# Patient Record
Sex: FEMALE | Race: WHITE | NOT HISPANIC OR LATINO | URBAN - METROPOLITAN AREA
[De-identification: names, ages, dates, MRNs, and addresses within clinical notes are randomized per-mention and may not be internally consistent; named-entity substitution may affect disease eponyms.]

---

## 2017-09-01 ENCOUNTER — OUTPATIENT (OUTPATIENT)
Dept: OUTPATIENT SERVICES | Facility: HOSPITAL | Age: 82
LOS: 1 days | End: 2017-09-01
Payer: MEDICAID

## 2017-09-01 DIAGNOSIS — Z96.60 PRESENCE OF UNSPECIFIED ORTHOPEDIC JOINT IMPLANT: Chronic | ICD-10-CM

## 2017-09-01 PROCEDURE — G9001: CPT

## 2017-09-19 ENCOUNTER — INPATIENT (INPATIENT)
Facility: HOSPITAL | Age: 82
LOS: 2 days | Discharge: HOME HEALTH | End: 2017-09-22
Attending: HOSPITALIST | Admitting: HOSPITALIST
Payer: MEDICARE

## 2017-09-19 VITALS
SYSTOLIC BLOOD PRESSURE: 108 MMHG | WEIGHT: 160.06 LBS | TEMPERATURE: 100 F | DIASTOLIC BLOOD PRESSURE: 55 MMHG | HEART RATE: 99 BPM | RESPIRATION RATE: 16 BRPM | OXYGEN SATURATION: 94 %

## 2017-09-19 DIAGNOSIS — Z96.60 PRESENCE OF UNSPECIFIED ORTHOPEDIC JOINT IMPLANT: Chronic | ICD-10-CM

## 2017-09-19 NOTE — ED ADULT TRIAGE NOTE - CHIEF COMPLAINT QUOTE
Pt arrives from home via EMS.  Pt noticed a strong smell to her urine on Saturday.  Pt daughter called PMD and started an antibiotic.  Pt has been having fevers "on and off."  Pt took Tylenol 500mg at 19:30PM.  Pt daughter reports pt is A&Ox3 but is more lethargic than usual.

## 2017-09-20 ENCOUNTER — TRANSCRIPTION ENCOUNTER (OUTPATIENT)
Age: 82
End: 2017-09-20

## 2017-09-20 DIAGNOSIS — N30.01 ACUTE CYSTITIS WITH HEMATURIA: ICD-10-CM

## 2017-09-20 DIAGNOSIS — M81.0 AGE-RELATED OSTEOPOROSIS WITHOUT CURRENT PATHOLOGICAL FRACTURE: ICD-10-CM

## 2017-09-20 DIAGNOSIS — N18.4 CHRONIC KIDNEY DISEASE, STAGE 4 (SEVERE): ICD-10-CM

## 2017-09-20 DIAGNOSIS — I10 ESSENTIAL (PRIMARY) HYPERTENSION: ICD-10-CM

## 2017-09-20 DIAGNOSIS — N39.0 URINARY TRACT INFECTION, SITE NOT SPECIFIED: ICD-10-CM

## 2017-09-20 DIAGNOSIS — E03.9 HYPOTHYROIDISM, UNSPECIFIED: ICD-10-CM

## 2017-09-20 DIAGNOSIS — I82.90 ACUTE EMBOLISM AND THROMBOSIS OF UNSPECIFIED VEIN: ICD-10-CM

## 2017-09-20 DIAGNOSIS — M19.90 UNSPECIFIED OSTEOARTHRITIS, UNSPECIFIED SITE: ICD-10-CM

## 2017-09-20 LAB
ALBUMIN SERPL ELPH-MCNC: 3.5 G/DL — SIGNIFICANT CHANGE UP (ref 3.3–5)
ALP SERPL-CCNC: 46 U/L — SIGNIFICANT CHANGE UP (ref 40–120)
ALT FLD-CCNC: 13 U/L — SIGNIFICANT CHANGE UP (ref 4–33)
APPEARANCE UR: SIGNIFICANT CHANGE UP
AST SERPL-CCNC: 11 U/L — SIGNIFICANT CHANGE UP (ref 4–32)
BACTERIA # UR AUTO: HIGH
BASE EXCESS BLDV CALC-SCNC: -2.2 MMOL/L — SIGNIFICANT CHANGE UP
BASOPHILS # BLD AUTO: 0.02 K/UL — SIGNIFICANT CHANGE UP (ref 0–0.2)
BASOPHILS NFR BLD AUTO: 0.2 % — SIGNIFICANT CHANGE UP (ref 0–2)
BILIRUB SERPL-MCNC: 1 MG/DL — SIGNIFICANT CHANGE UP (ref 0.2–1.2)
BILIRUB UR-MCNC: NEGATIVE — SIGNIFICANT CHANGE UP
BLOOD GAS VENOUS - CREATININE: 2.09 MG/DL — HIGH (ref 0.5–1.3)
BLOOD UR QL VISUAL: HIGH
BUN SERPL-MCNC: 29 MG/DL — HIGH (ref 7–23)
CALCIUM SERPL-MCNC: 9.3 MG/DL — SIGNIFICANT CHANGE UP (ref 8.4–10.5)
CHLORIDE BLDV-SCNC: 108 MMOL/L — SIGNIFICANT CHANGE UP (ref 96–108)
CHLORIDE SERPL-SCNC: 104 MMOL/L — SIGNIFICANT CHANGE UP (ref 98–107)
CO2 SERPL-SCNC: 19 MMOL/L — LOW (ref 22–31)
COLOR SPEC: YELLOW — SIGNIFICANT CHANGE UP
CREAT SERPL-MCNC: 2.18 MG/DL — HIGH (ref 0.5–1.3)
EOSINOPHIL # BLD AUTO: 0.56 K/UL — HIGH (ref 0–0.5)
EOSINOPHIL NFR BLD AUTO: 5 % — SIGNIFICANT CHANGE UP (ref 0–6)
GAS PNL BLDV: 133 MMOL/L — LOW (ref 136–146)
GLUCOSE BLDV-MCNC: 157 — HIGH (ref 70–99)
GLUCOSE SERPL-MCNC: 162 MG/DL — HIGH (ref 70–99)
GLUCOSE UR-MCNC: NEGATIVE — SIGNIFICANT CHANGE UP
HCO3 BLDV-SCNC: 23 MMOL/L — SIGNIFICANT CHANGE UP (ref 20–27)
HCT VFR BLD CALC: 35.7 % — SIGNIFICANT CHANGE UP (ref 34.5–45)
HCT VFR BLDV CALC: 38 % — SIGNIFICANT CHANGE UP (ref 34.5–45)
HGB BLD-MCNC: 12.3 G/DL — SIGNIFICANT CHANGE UP (ref 11.5–15.5)
HGB BLDV-MCNC: 12.3 G/DL — SIGNIFICANT CHANGE UP (ref 11.5–15.5)
IMM GRANULOCYTES # BLD AUTO: 0.07 # — SIGNIFICANT CHANGE UP
IMM GRANULOCYTES NFR BLD AUTO: 0.6 % — SIGNIFICANT CHANGE UP (ref 0–1.5)
KETONES UR-MCNC: SIGNIFICANT CHANGE UP
LACTATE BLDV-MCNC: 1.3 MMOL/L — SIGNIFICANT CHANGE UP (ref 0.5–2)
LEUKOCYTE ESTERASE UR-ACNC: HIGH
LYMPHOCYTES # BLD AUTO: 0.98 K/UL — LOW (ref 1–3.3)
LYMPHOCYTES # BLD AUTO: 8.7 % — LOW (ref 13–44)
MCHC RBC-ENTMCNC: 33.2 PG — SIGNIFICANT CHANGE UP (ref 27–34)
MCHC RBC-ENTMCNC: 34.5 % — SIGNIFICANT CHANGE UP (ref 32–36)
MCV RBC AUTO: 96.5 FL — SIGNIFICANT CHANGE UP (ref 80–100)
MONOCYTES # BLD AUTO: 0.61 K/UL — SIGNIFICANT CHANGE UP (ref 0–0.9)
MONOCYTES NFR BLD AUTO: 5.4 % — SIGNIFICANT CHANGE UP (ref 2–14)
MUCOUS THREADS # UR AUTO: SIGNIFICANT CHANGE UP
NEUTROPHILS # BLD AUTO: 8.98 K/UL — HIGH (ref 1.8–7.4)
NEUTROPHILS NFR BLD AUTO: 80.1 % — HIGH (ref 43–77)
NITRITE UR-MCNC: NEGATIVE — SIGNIFICANT CHANGE UP
NRBC # FLD: 0 — SIGNIFICANT CHANGE UP
PCO2 BLDV: 29 MMHG — LOW (ref 41–51)
PH BLDV: 7.47 PH — HIGH (ref 7.32–7.43)
PH UR: 7.5 — SIGNIFICANT CHANGE UP (ref 4.6–8)
PLATELET # BLD AUTO: 221 K/UL — SIGNIFICANT CHANGE UP (ref 150–400)
PMV BLD: 9.3 FL — SIGNIFICANT CHANGE UP (ref 7–13)
PO2 BLDV: 63 MMHG — HIGH (ref 35–40)
POTASSIUM BLDV-SCNC: 3.6 MMOL/L — SIGNIFICANT CHANGE UP (ref 3.4–4.5)
POTASSIUM SERPL-MCNC: 3.9 MMOL/L — SIGNIFICANT CHANGE UP (ref 3.5–5.3)
POTASSIUM SERPL-SCNC: 3.9 MMOL/L — SIGNIFICANT CHANGE UP (ref 3.5–5.3)
PROT SERPL-MCNC: 6.4 G/DL — SIGNIFICANT CHANGE UP (ref 6–8.3)
PROT UR-MCNC: 500 — HIGH
RBC # BLD: 3.7 M/UL — LOW (ref 3.8–5.2)
RBC # FLD: 12.1 % — SIGNIFICANT CHANGE UP (ref 10.3–14.5)
RBC CASTS # UR COMP ASSIST: HIGH (ref 0–?)
SAO2 % BLDV: 93.4 % — HIGH (ref 60–85)
SODIUM SERPL-SCNC: 138 MMOL/L — SIGNIFICANT CHANGE UP (ref 135–145)
SP GR SPEC: 1.02 — SIGNIFICANT CHANGE UP (ref 1–1.03)
SQUAMOUS # UR AUTO: SIGNIFICANT CHANGE UP
UROBILINOGEN FLD QL: NORMAL E.U. — SIGNIFICANT CHANGE UP (ref 0.1–0.2)
WBC # BLD: 11.22 K/UL — HIGH (ref 3.8–10.5)
WBC # FLD AUTO: 11.22 K/UL — HIGH (ref 3.8–10.5)
WBC CLUMPS #/AREA URNS HPF: PRESENT — HIGH (ref 0–?)
WBC UR QL: >50 — HIGH (ref 0–?)

## 2017-09-20 PROCEDURE — 71010: CPT | Mod: 26

## 2017-09-20 PROCEDURE — 99223 1ST HOSP IP/OBS HIGH 75: CPT | Mod: GC

## 2017-09-20 PROCEDURE — 12345: CPT | Mod: GC,NC

## 2017-09-20 RX ORDER — RALOXIFENE HYDROCHLORIDE 60 MG/1
1 TABLET, COATED ORAL
Qty: 0 | Refills: 0 | COMMUNITY

## 2017-09-20 RX ORDER — SODIUM CHLORIDE 0.65 %
2 AEROSOL, SPRAY (ML) NASAL
Qty: 0 | Refills: 0 | COMMUNITY

## 2017-09-20 RX ORDER — SODIUM CHLORIDE 9 MG/ML
1000 INJECTION INTRAMUSCULAR; INTRAVENOUS; SUBCUTANEOUS
Qty: 0 | Refills: 0 | Status: DISCONTINUED | OUTPATIENT
Start: 2017-09-20 | End: 2017-09-20

## 2017-09-20 RX ORDER — FOLIC ACID 0.8 MG
0.8 TABLET ORAL DAILY
Qty: 0 | Refills: 0 | Status: DISCONTINUED | OUTPATIENT
Start: 2017-09-20 | End: 2017-09-22

## 2017-09-20 RX ORDER — RISEDRONATE SODIUM 25.8; 4.2 MG/1; MG/1
1 TABLET, FILM COATED ORAL
Qty: 0 | Refills: 0 | COMMUNITY

## 2017-09-20 RX ORDER — ACETAMINOPHEN 500 MG
650 TABLET ORAL EVERY 6 HOURS
Qty: 0 | Refills: 0 | Status: DISCONTINUED | OUTPATIENT
Start: 2017-09-20 | End: 2017-09-22

## 2017-09-20 RX ORDER — CHOLECALCIFEROL (VITAMIN D3) 125 MCG
2000 CAPSULE ORAL DAILY
Qty: 0 | Refills: 0 | Status: DISCONTINUED | OUTPATIENT
Start: 2017-09-20 | End: 2017-09-22

## 2017-09-20 RX ORDER — HEPARIN SODIUM 5000 [USP'U]/ML
5000 INJECTION INTRAVENOUS; SUBCUTANEOUS EVERY 8 HOURS
Qty: 0 | Refills: 0 | Status: DISCONTINUED | OUTPATIENT
Start: 2017-09-20 | End: 2017-09-22

## 2017-09-20 RX ORDER — LEVOTHYROXINE SODIUM 125 MCG
50 TABLET ORAL
Qty: 0 | Refills: 0 | Status: DISCONTINUED | OUTPATIENT
Start: 2017-09-20 | End: 2017-09-22

## 2017-09-20 RX ORDER — SODIUM CHLORIDE 0.65 %
1 AEROSOL, SPRAY (ML) NASAL
Qty: 0 | Refills: 0 | Status: DISCONTINUED | OUTPATIENT
Start: 2017-09-20 | End: 2017-09-22

## 2017-09-20 RX ORDER — MAGNESIUM OXIDE 400 MG ORAL TABLET 241.3 MG
1 TABLET ORAL
Qty: 0 | Refills: 0 | COMMUNITY

## 2017-09-20 RX ORDER — LANOLIN ALCOHOL/MO/W.PET/CERES
1 CREAM (GRAM) TOPICAL
Qty: 0 | Refills: 0 | COMMUNITY

## 2017-09-20 RX ORDER — CEFTRIAXONE 500 MG/1
1 INJECTION, POWDER, FOR SOLUTION INTRAMUSCULAR; INTRAVENOUS EVERY 24 HOURS
Qty: 0 | Refills: 0 | Status: DISCONTINUED | OUTPATIENT
Start: 2017-09-21 | End: 2017-09-22

## 2017-09-20 RX ORDER — SODIUM CHLORIDE 9 MG/ML
500 INJECTION INTRAMUSCULAR; INTRAVENOUS; SUBCUTANEOUS ONCE
Qty: 0 | Refills: 0 | Status: DISCONTINUED | OUTPATIENT
Start: 2017-09-20 | End: 2017-09-20

## 2017-09-20 RX ORDER — PREGABALIN 225 MG/1
1000 CAPSULE ORAL DAILY
Qty: 0 | Refills: 0 | Status: DISCONTINUED | OUTPATIENT
Start: 2017-09-20 | End: 2017-09-22

## 2017-09-20 RX ORDER — SODIUM CHLORIDE 9 MG/ML
1000 INJECTION INTRAMUSCULAR; INTRAVENOUS; SUBCUTANEOUS ONCE
Qty: 0 | Refills: 0 | Status: COMPLETED | OUTPATIENT
Start: 2017-09-20 | End: 2017-09-20

## 2017-09-20 RX ORDER — SENNA PLUS 8.6 MG/1
2 TABLET ORAL AT BEDTIME
Qty: 0 | Refills: 0 | Status: DISCONTINUED | OUTPATIENT
Start: 2017-09-20 | End: 2017-09-22

## 2017-09-20 RX ORDER — CEFTRIAXONE 500 MG/1
1 INJECTION, POWDER, FOR SOLUTION INTRAMUSCULAR; INTRAVENOUS ONCE
Qty: 0 | Refills: 0 | Status: COMPLETED | OUTPATIENT
Start: 2017-09-20 | End: 2017-09-20

## 2017-09-20 RX ORDER — MAGNESIUM OXIDE 400 MG ORAL TABLET 241.3 MG
400 TABLET ORAL EVERY 12 HOURS
Qty: 0 | Refills: 0 | Status: DISCONTINUED | OUTPATIENT
Start: 2017-09-20 | End: 2017-09-22

## 2017-09-20 RX ORDER — INFLUENZA VIRUS VACCINE 15; 15; 15; 15 UG/.5ML; UG/.5ML; UG/.5ML; UG/.5ML
0.5 SUSPENSION INTRAMUSCULAR ONCE
Qty: 0 | Refills: 0 | Status: DISCONTINUED | OUTPATIENT
Start: 2017-09-20 | End: 2017-09-22

## 2017-09-20 RX ORDER — ASPIRIN/CALCIUM CARB/MAGNESIUM 324 MG
81 TABLET ORAL DAILY
Qty: 0 | Refills: 0 | Status: DISCONTINUED | OUTPATIENT
Start: 2017-09-20 | End: 2017-09-22

## 2017-09-20 RX ORDER — LEVOTHYROXINE SODIUM 125 MCG
75 TABLET ORAL
Qty: 0 | Refills: 0 | Status: DISCONTINUED | OUTPATIENT
Start: 2017-09-20 | End: 2017-09-22

## 2017-09-20 RX ORDER — RALOXIFENE HYDROCHLORIDE 60 MG/1
60 TABLET, COATED ORAL DAILY
Qty: 0 | Refills: 0 | Status: DISCONTINUED | OUTPATIENT
Start: 2017-09-20 | End: 2017-09-22

## 2017-09-20 RX ADMIN — Medication 0.8 MILLIGRAM(S): at 14:21

## 2017-09-20 RX ADMIN — RALOXIFENE HYDROCHLORIDE 60 MILLIGRAM(S): 60 TABLET, COATED ORAL at 14:21

## 2017-09-20 RX ADMIN — Medication 81 MILLIGRAM(S): at 13:06

## 2017-09-20 RX ADMIN — Medication 2000 UNIT(S): at 13:06

## 2017-09-20 RX ADMIN — PREGABALIN 1000 MICROGRAM(S): 225 CAPSULE ORAL at 13:06

## 2017-09-20 RX ADMIN — HEPARIN SODIUM 5000 UNIT(S): 5000 INJECTION INTRAVENOUS; SUBCUTANEOUS at 13:06

## 2017-09-20 RX ADMIN — SODIUM CHLORIDE 75 MILLILITER(S): 9 INJECTION INTRAMUSCULAR; INTRAVENOUS; SUBCUTANEOUS at 04:01

## 2017-09-20 RX ADMIN — MAGNESIUM OXIDE 400 MG ORAL TABLET 400 MILLIGRAM(S): 241.3 TABLET ORAL at 06:55

## 2017-09-20 RX ADMIN — SODIUM CHLORIDE 1000 MILLILITER(S): 9 INJECTION INTRAMUSCULAR; INTRAVENOUS; SUBCUTANEOUS at 01:56

## 2017-09-20 RX ADMIN — Medication 50 MICROGRAM(S): at 06:55

## 2017-09-20 RX ADMIN — SODIUM CHLORIDE 75 MILLILITER(S): 9 INJECTION INTRAMUSCULAR; INTRAVENOUS; SUBCUTANEOUS at 06:55

## 2017-09-20 RX ADMIN — HEPARIN SODIUM 5000 UNIT(S): 5000 INJECTION INTRAVENOUS; SUBCUTANEOUS at 22:31

## 2017-09-20 RX ADMIN — MAGNESIUM OXIDE 400 MG ORAL TABLET 400 MILLIGRAM(S): 241.3 TABLET ORAL at 17:11

## 2017-09-20 RX ADMIN — CEFTRIAXONE 100 GRAM(S): 500 INJECTION, POWDER, FOR SOLUTION INTRAMUSCULAR; INTRAVENOUS at 01:56

## 2017-09-20 RX ADMIN — HEPARIN SODIUM 5000 UNIT(S): 5000 INJECTION INTRAVENOUS; SUBCUTANEOUS at 06:55

## 2017-09-20 RX ADMIN — SENNA PLUS 2 TABLET(S): 8.6 TABLET ORAL at 22:31

## 2017-09-20 NOTE — H&P ADULT - NSHPREVIEWOFSYSTEMS_GEN_ALL_CORE
REVIEW OF SYSTEMS:    CONSTITUTIONAL: (+) fever  EYES/ENT: No visual changes;  no throat pain   NECK: No pain or stiffness  RESPIRATORY: No cough, wheezing, hemoptysis; No shortness of breath  CARDIOVASCULAR: No chest pain or palpitations  GASTROINTESTINAL: No abdominal or epigastric pain. No nausea, vomiting, or hematemesis; No diarrhea or constipation. No melena or hematochezia.  GENITOURINARY: No dysuria, change in frequency or hematuria, (+) foul smelling urine   NEUROLOGICAL: No numbness or weakness  SKIN: No itching, burning, rashes, or lesions   All other review of systems is negative unless indicated above.

## 2017-09-20 NOTE — PROGRESS NOTE ADULT - PROBLEM SELECTOR PLAN 1
p/w worsening mental status x 3 days with fever and foul smelling urine; no active complaints at this time  -UA positive, f/u urine cx, c/w ceftriaxone - will adjust ABx once speciation and sensitivities are available  -c/w maintenance IVF - NS at 75 mL/Hr x 24 hours   -antiypretic PRN  -urine cx from last admission in Nov 2016 grew proteus, sensitive to ceftriaxone

## 2017-09-20 NOTE — ED PROVIDER NOTE - OBJECTIVE STATEMENT
87yo female with HTN, osteoporosis, arthritis, hypothyroid, p/w fevers, AMS, and foul smelling urine x3 days worse today. Pt was started on bactrim last night, but today with persistent fevers Tmax today 101 and lethargy. No cough, no abdominal pain, no vomiting. No rash, no sick contacts

## 2017-09-20 NOTE — H&P ADULT - ASSESSMENT
88F h/o HTN (controlled off meds), osteoporosis, arthritis, hypothyroid, bedbound, incontinent and wears diaper w/ multiple UTI over past year p/w fever of 101 at home and foul smelling urine x3 days 2/2 UTI:

## 2017-09-20 NOTE — DISCHARGE NOTE ADULT - HOSPITAL COURSE
HPI:  88F h/o HTN (controlled off meds), osteoporosis, arthritis, hypothyroid, bedbound, incontinent and wears diaper w/ multiple UTI over past year p/w fever of 101 at home and foul smelling urine x3 days. Daughter first noted foul smelling urine and brownish discharge in urine 3 days ago. Called PMD who prescribed bactrim for patient. However when patient spiked fever of 101 on day of arrival, daughter became concerned and brought patient to [facility] ED. Pt denies dysuria. Unable to assess urinary frequency given incontinence. Remaining ROS negative. No associated HA, CP, SOB, abn pain, N/V/D/C, change in MS, strength/sensation, melena. No sick contacts at home or recent travel. Last UTI in March 2017 per daughter. Daughter is health care proxy. Patient is DNR/DNI.      In ED: vitals 100.2, 99, 108/55, 16, 94 on RA. Labs w/ WBC 11.22, hgb 12.3, plt 221, lytes WNL, Cr 2.18 (baseline 2.1), VBG pH 7.47 w/ lactate 1.3. CXR WNL. UA positive w/ >50 WBC, +LE, +bacteria. Given ceftriaxone and 1L NS. Blood and urine cx sent.     Hospital course:  The pt was admitted for suspected UTI with fever and foul smelling urine.  The pt was treated empirically with Ceftriaxone given her hx of Ceftriaxone-sensitive Proteus.  When the antibiotic sensitivity and speciation returned on the urine culture, the antibiotics were adjusted appropriately.  The pt was stable for discharge with outpatient primary care follow up. HPI:  88F h/o HTN (controlled off meds), osteoporosis, arthritis, hypothyroid, bedbound, incontinent and wears diaper w/ multiple UTI over past year p/w fever of 101 at home and foul smelling urine x3 days. Daughter first noted foul smelling urine and brownish discharge in urine 3 days ago. Called PMD who prescribed bactrim for patient. However when patient spiked fever of 101 on day of arrival, daughter became concerned and brought patient to [facility] ED. Pt denies dysuria. Unable to assess urinary frequency given incontinence. Remaining ROS negative. No associated HA, CP, SOB, abn pain, N/V/D/C, change in MS, strength/sensation, melena. No sick contacts at home or recent travel. Last UTI in March 2017 per daughter. Daughter is health care proxy. Patient is DNR/DNI.      In ED: vitals 100.2, 99, 108/55, 16, 94 on RA. Labs w/ WBC 11.22, hgb 12.3, plt 221, lytes WNL, Cr 2.18 (baseline 2.1), VBG pH 7.47 w/ lactate 1.3. CXR WNL. UA positive w/ >50 WBC, +LE, +bacteria. Given ceftriaxone and 1L NS. Blood and urine cx sent.     Hospital course:  The pt was admitted for suspected UTI with fever and foul smelling urine.  The pt was treated empirically with Ceftriaxone given her hx of Ceftriaxone-sensitive Proteus. The urine culture initially returned as a contaminant. The repeat urine culture showed no growth. The patient had clinically improved after completing a 3 day course of ceftriaxone, so antibiotics were stopped.  The pt was stable for discharge with outpatient primary care follow up. HPI:  88F h/o HTN (controlled off meds), osteoporosis, arthritis, hypothyroid, bedbound, incontinent and wears diaper w/ multiple UTI over past year p/w fever of 101 at home and foul smelling urine x3 days. Daughter first noted foul smelling urine and brownish discharge in urine 3 days ago. Called PMD who prescribed bactrim for patient. However when patient spiked fever of 101 on day of arrival, daughter became concerned and brought patient to [facility] ED. Pt denies dysuria. Unable to assess urinary frequency given incontinence. Remaining ROS negative. No associated HA, CP, SOB, abn pain, N/V/D/C, change in MS, strength/sensation, melena. No sick contacts at home or recent travel. Last UTI in March 2017 per daughter. Daughter is health care proxy. Patient is DNR/DNI.      In ED: vitals 100.2, 99, 108/55, 16, 94 on RA. Labs w/ WBC 11.22, hgb 12.3, plt 221, lytes WNL, Cr 2.18 (baseline 2.1), VBG pH 7.47 w/ lactate 1.3. CXR WNL. UA positive w/ >50 WBC, +LE, +bacteria. Given ceftriaxone and 1L NS. Blood and urine cx sent.     Hospital course:  The pt was admitted for suspected UTI with fever and foul smelling urine.  The pt was treated empirically with Ceftriaxone given her hx of Ceftriaxone-sensitive Proteus. The urine culture initially returned as a contaminant. The repeat urine culture showed no growth. The patient had clinically improved after completing a 3 day course of ceftriaxone, so antibiotics were stopped.  The pt was stable for discharge with outpatient primary care follow up.  d/w son Ronnell on day of discharge.

## 2017-09-20 NOTE — H&P ADULT - PMH
Arthritis    Hypertension    Hypothyroid Arthritis    Hypertension    Hypothyroid    Injury of left upper arm, subsequent encounter    Recurrent UTI Arthritis    Bedridden    Fall  history  Hypertension    Hypothyroid    Incontinent of urine    Injury of left upper arm, subsequent encounter    Recurrent UTI    Vitamin B12 deficiency    Vitamin D deficiency

## 2017-09-20 NOTE — DISCHARGE NOTE ADULT - CONDITION (STATED IN TERMS THAT PERMIT A SPECIFIC MEASURABLE COMPARISON WITH CONDITION ON ADMISSION):
The pt was admitted for UTI with fever and encephalopathy meeting sepsis criteria.  The pt was treated with antibiotics and was stable for discharge to home.

## 2017-09-20 NOTE — ED PROVIDER NOTE - ATTENDING CONTRIBUTION TO CARE
DR. ROBERTS, ATTENDING MD-  I performed a face to face bedside interview with patient regarding history of present illness, review of symptoms and past medical history. I completed an independent physical exam.  I have discussed patient's plan of care with the resident.   Documentation as above in the note.    HPI: 87 yo F with arthritis, HTN, Hypothyroid from home that has visiting home nursing that was BIB daughter for AMS, on/off fevers, chills, and foul smelling urine. was started on PO Abx last week but continues to have symptoms. No falls, trauma, travel, rash.   EXAM: NAD, head atraumatic, eyes EOMI, PERRL, heart RRR, lungs ctab, abd soft nontender, wearing diaper, no pitting edema in BLE.   MDM: Worry for UTI. Previous chart review shows pan sensitive UTI including ceftriaxone. Will obtain labs, imaging, UA/culture and most likely admit.   PMD Alexandre Patterson not affiliated with [facility].

## 2017-09-20 NOTE — H&P ADULT - PROBLEM SELECTOR PLAN 4
-controlled off meds -borderline low BP measurements presently  -controlled off meds  -IVF hydration

## 2017-09-20 NOTE — DISCHARGE NOTE ADULT - SECONDARY DIAGNOSIS.
Hypertension Hypothyroid Osteoporosis, unspecified osteoporosis type, unspecified pathological fracture presence Vitamin B12 deficiency Vitamin D deficiency

## 2017-09-20 NOTE — H&P ADULT - PROBLEM SELECTOR PLAN 6
-avoid nephrotoxic agents -creatinine = 2.18 (previously 2.14 in 11/2016)  -has trace ketonuria - suggestive of dehydration  -avoid nephrotoxic agents  -on IVF hydration; evaluate for any signs of fluid overload -creatinine = 2.18 (previously 2.14 in 11/2016)  -has trace ketonuria - suggestive of dehydration  -avoid nephrotoxic agents  -on IVF hydration; evaluate for any signs of fluid overload  -pls evaluate need for Q12 magnesium oxide supplementation in the setting of CKD (f/u labs at 9:00 AM)

## 2017-09-20 NOTE — PROGRESS NOTE ADULT - PROBLEM SELECTOR PLAN 6
-creatinine = 2.18 (previously 2.14 in 11/2016)  -has trace ketonuria - suggestive of dehydration  -avoid nephrotoxic agents + renally dose all meds  -on IVF hydration; evaluate for any signs of fluid overload - currently euvolemic   -on Mg Q12 in setting of CKD

## 2017-09-20 NOTE — DISCHARGE NOTE ADULT - CARE PROVIDER_API CALL
Alexandre Patterson), Internal Medicine  [address] [place] [address]  Phone: [phone]  Fax: [phone]

## 2017-09-20 NOTE — DISCHARGE NOTE ADULT - CARE PLAN
Principal Discharge DX:	UTI (urinary tract infection)  Goal:	treat  Instructions for follow-up, activity and diet:	You were brought into the hospital with a fever and foul smelling urine by your daughter.  A UTI was suspected, and you were treated with broad spectrum antibiotics.  Once the bacterial species and antibiotic sensitivity were known, your antibiotics were adjusted appropriately.  Please follow up with your outpatient primary care doctor after discharge.  Secondary Diagnosis:	Hypertension  Goal:	Maintain  Instructions for follow-up, activity and diet:	Although you have a history of high blood pressure, you were not given any medications for high blood pressure, as your blood pressures remained low during this hospitalization.  Please follow up with your outpatient primary care doctor after discharge.  Secondary Diagnosis:	Hypothyroid  Goal:	maintain  Instructions for follow-up, activity and diet:	Please take your synthroid as prescribed and follow up with your outpatient primary care provider.  Secondary Diagnosis:	Osteoporosis, unspecified osteoporosis type, unspecified pathological fracture presence  Goal:	maintain  Instructions for follow-up, activity and diet:	Please take your medication as prescribed and follow up with your outpatient primary care provider.  Secondary Diagnosis:	Vitamin B12 deficiency  Goal:	Treat  Instructions for follow-up, activity and diet:	Please take your B12 supplement as prescribed.  Secondary Diagnosis:	Vitamin D deficiency  Goal:	Treat  Instructions for follow-up, activity and diet:	Please take your Vitamin D supplement as prescribed. Principal Discharge DX:	UTI (urinary tract infection)  Goal:	treat  Instructions for follow-up, activity and diet:	You were brought into the hospital with a fever and foul smelling urine by your daughter.  A UTI was suspected, and you were treated with broad spectrum antibiotics. You improved after 3 days of ceftriaxone.  Please follow up with your outpatient primary care doctor after discharge.  Secondary Diagnosis:	Hypertension  Goal:	Maintain  Instructions for follow-up, activity and diet:	Although you have a history of high blood pressure, you were not given any medications for high blood pressure, as your blood pressures remained low during this hospitalization.  Please follow up with your outpatient primary care doctor after discharge.  Secondary Diagnosis:	Hypothyroid  Goal:	maintain  Instructions for follow-up, activity and diet:	Please take your synthroid as prescribed and follow up with your outpatient primary care provider.  Secondary Diagnosis:	Osteoporosis, unspecified osteoporosis type, unspecified pathological fracture presence  Goal:	maintain  Instructions for follow-up, activity and diet:	Please take your medication as prescribed and follow up with your outpatient primary care provider.  Secondary Diagnosis:	Vitamin B12 deficiency  Goal:	Treat  Instructions for follow-up, activity and diet:	Please take your B12 supplement as prescribed.  Secondary Diagnosis:	Vitamin D deficiency  Goal:	Treat  Instructions for follow-up, activity and diet:	Please take your Vitamin D supplement as prescribed.

## 2017-09-20 NOTE — DISCHARGE NOTE ADULT - PLAN OF CARE
treat You were brought into the hospital with a fever and foul smelling urine by your daughter.  A UTI was suspected, and you were treated with broad spectrum antibiotics.  Once the bacterial species and antibiotic sensitivity were known, your antibiotics were adjusted appropriately.  Please follow up with your outpatient primary care doctor after discharge. Maintain Although you have a history of high blood pressure, you were not given any medications for high blood pressure, as your blood pressures remained low during this hospitalization.  Please follow up with your outpatient primary care doctor after discharge. maintain Please take your synthroid as prescribed and follow up with your outpatient primary care provider. Please take your medication as prescribed and follow up with your outpatient primary care provider. Treat Please take your B12 supplement as prescribed. Please take your Vitamin D supplement as prescribed. You were brought into the hospital with a fever and foul smelling urine by your daughter.  A UTI was suspected, and you were treated with broad spectrum antibiotics. You improved after 3 days of ceftriaxone.  Please follow up with your outpatient primary care doctor after discharge.

## 2017-09-20 NOTE — H&P ADULT - NSHPPHYSICALEXAM_GEN_ALL_CORE
Vital Signs Last 24 Hrs  T(C): 37.7 (09-20-17 @ 02:16), Max: 37.9 (09-19-17 @ 23:18)  T(F): 99.9 (09-20-17 @ 02:16), Max: 100.2 (09-19-17 @ 23:18)  HR: 83 (09-20-17 @ 04:23) (83 - 99)  BP: 121/74 (09-20-17 @ 04:23) (107/52 - 121/74)  BP(mean): --  RR: 13 (09-20-17 @ 04:23) (13 - 16)  SpO2: 98% (09-20-17 @ 04:23) (94% - 98%)    GENERAL: NAD, responds to commands appropriately,   HEENT: EOMI, MMM, no oropharyngeal lesions or erythema appreciated  Pulm: normal work of breathing, CTABL  CV: RRR, S1&S2+, no m/r/g appreciated  ABDOMEN: soft, nt, nd, no hepatosplenomegaly  EXTREMITIES:  no appreciable edema in b/l LE, no pressure ulcer seen on backside   Neuro: A&Ox3, strength and sensation intact in RUE, b/l LEs, unable to assess LUE   MSK:l limited ROM RUE 2/2 fall and fracture of arm   SKIN: warm and dry, no visible rash

## 2017-09-20 NOTE — H&P ADULT - HISTORY OF PRESENT ILLNESS
88F h/o HTN (controlled off meds), osteoporosis, arthritis, hypothyroid, bedbound, incontinent and wears diaper w/ multiple UTI over past year p/w fever of 101 at home and foul smelling urine x3 days. Daughter first noted foul smelling urine and brownish discharge in urine 3 days ago. Called PMD who prescribed bactrim for patient. However when patient spiked fever of 101 on day of arrival, daughter became concerned and brought patient to [facility] ED. Pt denies dysuria. Unable to assess urinary frequency given incontinence. Remaining ROS negative. No associated HA, CP, SOB, abn pain, N/V/D/C, change in MS, strength/sensation, melena. No sick contacts at home or recent travel. Last UTI in March 2017 per daughter. Daughter is health care proxy. Patient is DNR/DNI.      In ED: vitals 100.2, 99, 108/55, 16, 94 on RA. Labs w/ WBC 11.22, hgb 12.3, plt 221, lytes WNL, Cr 2.18 (baseline 2.1), VBG pH 7.47 w/ lactate 1.3. CXR WNL. UA positive w/ >50 WBC, +LE, +bacteria. Given ceftriaxone and 1L NS. Blood and urine cx sent.

## 2017-09-20 NOTE — DISCHARGE NOTE ADULT - MEDICATION SUMMARY - MEDICATIONS TO TAKE
I will START or STAY ON the medications listed below when I get home from the hospital:    acetaminophen 325 mg oral tablet  -- 2 tab(s) by mouth every 6 hours, As needed, mild to moderate pain (1-6)  -- Indication: For Pain    aspirin 81 mg oral delayed release tablet  -- 1 tab(s) by mouth once a day  -- Indication: For CAD prophylxasis    raloxifene 60 mg oral tablet  -- 1 tab(s) by mouth once a day  -- Indication: For Osteoporosis, unspecified osteoporosis type, unspecified pathological fracture presence    risedronate 35 mg oral tablet  -- 1 tab(s) by mouth once a week  -- Indication: For Osteoporosis, unspecified osteoporosis type, unspecified pathological fracture presence    senna oral tablet  -- 2 tab(s) by mouth once a day (at bedtime)  -- Indication: For Constipation    magnesium oxide 400 mg (240 mg elemental magnesium) oral tablet  -- 1 tab(s) by mouth 2 times a day  -- Indication: For Constipation    Nasal Saline 0.65% nasal spray  -- 2 spray(s) into nose 4 times a day, As Needed  -- Indication: For Asthma    Melatonin 3 mg oral tablet  -- 1 tab(s) by mouth once (at bedtime)  -- Indication: For Insomnia    fluticasone propionate  -- 2 spray(s) in each nostril once a day  -- Indication: For Asthma    levothyroxine 50 mcg (0.05 mg) oral tablet  -- 1 tab(s) by mouth every other day  -- Indication: For Hypothyroid    levothyroxine 75 mcg (0.075 mg) oral tablet  -- 1 tab(s) by mouth every other day  -- Indication: For Hypothyroid    cholecalciferol oral tablet  -- 2000 unit(s) by mouth once a day  -- Indication: For Osteoporosis, unspecified osteoporosis type, unspecified pathological fracture presence    cyanocobalamin 1000 mcg oral tablet  -- 1 tab(s) by mouth once a day  -- Indication: For Osteoporosis, unspecified osteoporosis type, unspecified pathological fracture presence    folic acid 0.8 mg oral tablet  -- 1 tab(s) by mouth once a day  -- Indication: For Nutrition

## 2017-09-20 NOTE — H&P ADULT - NSHPLABSRESULTS_GEN_ALL_CORE
12.3    )-----------( 221      ( 20 Sep 2017 00:18 )             35.7       09-20    138  |  104  |  29<H>  ----------------------------<  162<H>  3.9   |  19<L>  |  2.18<H>    Ca    9.3      20 Sep 2017 00:18    TPro  6.4  /  Alb  3.5  /  TBili  1.0  /  DBili  x   /  AST  11  /  ALT  13  /  AlkPhos  46  -          Urinalysis Basic - ( 20 Sep 2017 02:13 )    Color: YELLOW / Appearance: TURBID / S.017 / pH: 7.5  Gluc: NEGATIVE / Ketone: TRACE  / Bili: NEGATIVE / Urobili: NORMAL E.U.   Blood: TRACE / Protein: 500 / Nitrite: NEGATIVE   Leuk Esterase: SMALL / RBC: 5-10 / WBC >50   Sq Epi: OCC / Non Sq Epi: x / Bacteria: MANY    Cultures: blood and urine cultures sent     Radiology: intrepreted by me, CXR WNL     EKG:   no admission EKG

## 2017-09-20 NOTE — DISCHARGE NOTE ADULT - ADDITIONAL INSTRUCTIONS
Please follow up with your outpatient primary care physician after discharge.  Please take your medications as prescribed.

## 2017-09-20 NOTE — H&P ADULT - PROBLEM SELECTOR PLAN 2
-c/w home dose of synthroid -AMS not due to thyroid issues (TSH = 1)  -c/w home dose of synthroid  -follow bowel movement and treat for constipation, as necessary

## 2017-09-20 NOTE — H&P ADULT - PROBLEM SELECTOR PLAN 1
-UA positive, f/u urine cx, c/w ceftriaxone   -c/w maintenance IVF -UA positive, f/u urine cx, c/w ceftriaxone   -c/w maintenance IVF  -urine cx from last admission in Nov 2016 grew proteus, sensitive to ceftriaxone -p/w worsening mental status x 3 days  -also with fever, foul smelling urine  -no improvement with outpatient bactrim  -UA positive, f/u urine cx, c/w ceftriaxone  -trace ketonuria suggestive of hydration  -c/w maintenance IVF - NS at 75 mL/Hr x 24 hours (s/p 1 liter NS IVF in the ED)  -antiypretic PRN  -urine cx from last admission in Nov 2016 grew proteus, sensitive to ceftriaxone

## 2017-09-20 NOTE — DISCHARGE NOTE ADULT - CONDITIONS AT DISCHARGE
This Patient is stable , alert, oriented x2,confused; speech is garbled, left Arm is contracted and painful when moved.  Aspiration precautions; is incontinent of bowel and bladder.  She is at risk for IAD , but her Skin is intact and Vital signs are stable.

## 2017-09-20 NOTE — ED ADULT NURSE NOTE - TEMPERATURE IN CELSIUS (DEGREES C)
Detail Level: Simple
Note Text (......Xxx Chief Complaint.): This diagnosis correlates with the
Other (Free Text): See attached photo
37.7

## 2017-09-21 DIAGNOSIS — R69 ILLNESS, UNSPECIFIED: ICD-10-CM

## 2017-09-21 LAB
ALBUMIN SERPL ELPH-MCNC: 3.3 G/DL — SIGNIFICANT CHANGE UP (ref 3.3–5)
ALP SERPL-CCNC: 40 U/L — SIGNIFICANT CHANGE UP (ref 40–120)
ALT FLD-CCNC: 12 U/L — SIGNIFICANT CHANGE UP (ref 4–33)
AST SERPL-CCNC: 11 U/L — SIGNIFICANT CHANGE UP (ref 4–32)
BACTERIA UR CULT: SIGNIFICANT CHANGE UP
BASOPHILS # BLD AUTO: 0.01 K/UL — SIGNIFICANT CHANGE UP (ref 0–0.2)
BASOPHILS NFR BLD AUTO: 0.2 % — SIGNIFICANT CHANGE UP (ref 0–2)
BILIRUB SERPL-MCNC: 0.4 MG/DL — SIGNIFICANT CHANGE UP (ref 0.2–1.2)
BUN SERPL-MCNC: 19 MG/DL — SIGNIFICANT CHANGE UP (ref 7–23)
CALCIUM SERPL-MCNC: 8.9 MG/DL — SIGNIFICANT CHANGE UP (ref 8.4–10.5)
CHLORIDE SERPL-SCNC: 109 MMOL/L — HIGH (ref 98–107)
CO2 SERPL-SCNC: 19 MMOL/L — LOW (ref 22–31)
CREAT SERPL-MCNC: 1.81 MG/DL — HIGH (ref 0.5–1.3)
EOSINOPHIL # BLD AUTO: 0.47 K/UL — SIGNIFICANT CHANGE UP (ref 0–0.5)
EOSINOPHIL NFR BLD AUTO: 7.1 % — HIGH (ref 0–6)
GLUCOSE SERPL-MCNC: 111 MG/DL — HIGH (ref 70–99)
HBA1C BLD-MCNC: 5.9 % — HIGH (ref 4–5.6)
HCT VFR BLD CALC: 33.6 % — LOW (ref 34.5–45)
HGB BLD-MCNC: 11.7 G/DL — SIGNIFICANT CHANGE UP (ref 11.5–15.5)
IMM GRANULOCYTES # BLD AUTO: 0.07 # — SIGNIFICANT CHANGE UP
IMM GRANULOCYTES NFR BLD AUTO: 1.1 % — SIGNIFICANT CHANGE UP (ref 0–1.5)
LYMPHOCYTES # BLD AUTO: 1.34 K/UL — SIGNIFICANT CHANGE UP (ref 1–3.3)
LYMPHOCYTES # BLD AUTO: 20.1 % — SIGNIFICANT CHANGE UP (ref 13–44)
MAGNESIUM SERPL-MCNC: 2.2 MG/DL — SIGNIFICANT CHANGE UP (ref 1.6–2.6)
MCHC RBC-ENTMCNC: 33.8 PG — SIGNIFICANT CHANGE UP (ref 27–34)
MCHC RBC-ENTMCNC: 34.8 % — SIGNIFICANT CHANGE UP (ref 32–36)
MCV RBC AUTO: 97.1 FL — SIGNIFICANT CHANGE UP (ref 80–100)
MONOCYTES # BLD AUTO: 0.54 K/UL — SIGNIFICANT CHANGE UP (ref 0–0.9)
MONOCYTES NFR BLD AUTO: 8.1 % — SIGNIFICANT CHANGE UP (ref 2–14)
NEUTROPHILS # BLD AUTO: 4.23 K/UL — SIGNIFICANT CHANGE UP (ref 1.8–7.4)
NEUTROPHILS NFR BLD AUTO: 63.4 % — SIGNIFICANT CHANGE UP (ref 43–77)
NRBC # FLD: 0 — SIGNIFICANT CHANGE UP
PHOSPHATE SERPL-MCNC: 2.2 MG/DL — LOW (ref 2.5–4.5)
PLATELET # BLD AUTO: 209 K/UL — SIGNIFICANT CHANGE UP (ref 150–400)
PMV BLD: 9.4 FL — SIGNIFICANT CHANGE UP (ref 7–13)
POTASSIUM SERPL-MCNC: 4 MMOL/L — SIGNIFICANT CHANGE UP (ref 3.5–5.3)
POTASSIUM SERPL-SCNC: 4 MMOL/L — SIGNIFICANT CHANGE UP (ref 3.5–5.3)
PROT SERPL-MCNC: 6.1 G/DL — SIGNIFICANT CHANGE UP (ref 6–8.3)
RBC # BLD: 3.46 M/UL — LOW (ref 3.8–5.2)
RBC # FLD: 12.2 % — SIGNIFICANT CHANGE UP (ref 10.3–14.5)
SODIUM SERPL-SCNC: 141 MMOL/L — SIGNIFICANT CHANGE UP (ref 135–145)
SPECIMEN SOURCE: SIGNIFICANT CHANGE UP
WBC # BLD: 6.66 K/UL — SIGNIFICANT CHANGE UP (ref 3.8–10.5)
WBC # FLD AUTO: 6.66 K/UL — SIGNIFICANT CHANGE UP (ref 3.8–10.5)

## 2017-09-21 PROCEDURE — 99233 SBSQ HOSP IP/OBS HIGH 50: CPT | Mod: GC

## 2017-09-21 RX ORDER — POTASSIUM PHOSPHATE, MONOBASIC POTASSIUM PHOSPHATE, DIBASIC 236; 224 MG/ML; MG/ML
15 INJECTION, SOLUTION INTRAVENOUS ONCE
Qty: 0 | Refills: 0 | Status: COMPLETED | OUTPATIENT
Start: 2017-09-21 | End: 2017-09-21

## 2017-09-21 RX ADMIN — Medication 0.8 MILLIGRAM(S): at 11:31

## 2017-09-21 RX ADMIN — RALOXIFENE HYDROCHLORIDE 60 MILLIGRAM(S): 60 TABLET, COATED ORAL at 11:30

## 2017-09-21 RX ADMIN — SENNA PLUS 2 TABLET(S): 8.6 TABLET ORAL at 22:52

## 2017-09-21 RX ADMIN — HEPARIN SODIUM 5000 UNIT(S): 5000 INJECTION INTRAVENOUS; SUBCUTANEOUS at 05:53

## 2017-09-21 RX ADMIN — PREGABALIN 1000 MICROGRAM(S): 225 CAPSULE ORAL at 11:30

## 2017-09-21 RX ADMIN — POTASSIUM PHOSPHATE, MONOBASIC POTASSIUM PHOSPHATE, DIBASIC 62.5 MILLIMOLE(S): 236; 224 INJECTION, SOLUTION INTRAVENOUS at 08:01

## 2017-09-21 RX ADMIN — Medication 75 MICROGRAM(S): at 05:53

## 2017-09-21 RX ADMIN — CEFTRIAXONE 100 GRAM(S): 500 INJECTION, POWDER, FOR SOLUTION INTRAMUSCULAR; INTRAVENOUS at 22:55

## 2017-09-21 RX ADMIN — MAGNESIUM OXIDE 400 MG ORAL TABLET 400 MILLIGRAM(S): 241.3 TABLET ORAL at 18:42

## 2017-09-21 RX ADMIN — Medication 81 MILLIGRAM(S): at 11:30

## 2017-09-21 RX ADMIN — Medication 2000 UNIT(S): at 11:30

## 2017-09-21 RX ADMIN — MAGNESIUM OXIDE 400 MG ORAL TABLET 400 MILLIGRAM(S): 241.3 TABLET ORAL at 05:53

## 2017-09-21 RX ADMIN — HEPARIN SODIUM 5000 UNIT(S): 5000 INJECTION INTRAVENOUS; SUBCUTANEOUS at 13:22

## 2017-09-21 RX ADMIN — CEFTRIAXONE 100 GRAM(S): 500 INJECTION, POWDER, FOR SOLUTION INTRAMUSCULAR; INTRAVENOUS at 01:55

## 2017-09-21 RX ADMIN — HEPARIN SODIUM 5000 UNIT(S): 5000 INJECTION INTRAVENOUS; SUBCUTANEOUS at 22:51

## 2017-09-21 NOTE — PROGRESS NOTE ADULT - PROBLEM SELECTOR PLAN 6
-creatinine = 2.18 (previously 2.14 in 11/2016)  -avoid nephrotoxic agents + renally dose all meds  -on IVF hydration; evaluate for any signs of fluid overload - currently euvolemic   -on Mg Q12 in setting of CKD -creatinine = 1.81 (previously 2.14 in 11/2016)  -avoid nephrotoxic agents + renally dose all meds  -on IVF hydration; evaluate for any signs of fluid overload - currently euvolemic   -on Mg Q12 in setting of CKD

## 2017-09-21 NOTE — PROGRESS NOTE ADULT - PROBLEM SELECTOR PLAN 1
On empiric Ceftriaxone; afebrile w/o leukocytosis; mental status improved  - f/u urine cx, c/w ceftriaxone - will adjust ABx once speciation and sensitivities are available  -c/w maintenance IVF - NS at 75 mL/Hr x 24 hours   -antiypretic PRN  -urine cx from last admission in Nov 2016 grew proteus, sensitive to ceftriaxone On empiric Ceftriaxone; afebrile w/o leukocytosis; mental status improved  - f/u urine cx, c/w ceftriaxone - will adjust ABx once speciation and sensitivities are available  -c/w maintenance IVF - NS at 75 mL/Hr x 24 hours   -antipyretic PRN  -urine cx from last admission in Nov 2016 grew proteus, sensitive to ceftriaxone

## 2017-09-22 VITALS
TEMPERATURE: 99 F | DIASTOLIC BLOOD PRESSURE: 58 MMHG | SYSTOLIC BLOOD PRESSURE: 121 MMHG | RESPIRATION RATE: 18 BRPM | OXYGEN SATURATION: 97 % | HEART RATE: 72 BPM

## 2017-09-22 LAB
BACTERIA UR CULT: SIGNIFICANT CHANGE UP
BASOPHILS # BLD AUTO: 0.01 K/UL — SIGNIFICANT CHANGE UP (ref 0–0.2)
BASOPHILS NFR BLD AUTO: 0.2 % — SIGNIFICANT CHANGE UP (ref 0–2)
BUN SERPL-MCNC: 18 MG/DL — SIGNIFICANT CHANGE UP (ref 7–23)
CALCIUM SERPL-MCNC: 9.3 MG/DL — SIGNIFICANT CHANGE UP (ref 8.4–10.5)
CHLORIDE SERPL-SCNC: 106 MMOL/L — SIGNIFICANT CHANGE UP (ref 98–107)
CO2 SERPL-SCNC: 20 MMOL/L — LOW (ref 22–31)
CREAT SERPL-MCNC: 1.67 MG/DL — HIGH (ref 0.5–1.3)
EOSINOPHIL # BLD AUTO: 0.51 K/UL — HIGH (ref 0–0.5)
EOSINOPHIL NFR BLD AUTO: 7.8 % — HIGH (ref 0–6)
GLUCOSE SERPL-MCNC: 106 MG/DL — HIGH (ref 70–99)
HCT VFR BLD CALC: 35.2 % — SIGNIFICANT CHANGE UP (ref 34.5–45)
HGB BLD-MCNC: 12.1 G/DL — SIGNIFICANT CHANGE UP (ref 11.5–15.5)
IMM GRANULOCYTES # BLD AUTO: 0.07 # — SIGNIFICANT CHANGE UP
IMM GRANULOCYTES NFR BLD AUTO: 1.1 % — SIGNIFICANT CHANGE UP (ref 0–1.5)
LYMPHOCYTES # BLD AUTO: 1.67 K/UL — SIGNIFICANT CHANGE UP (ref 1–3.3)
LYMPHOCYTES # BLD AUTO: 25.6 % — SIGNIFICANT CHANGE UP (ref 13–44)
MAGNESIUM SERPL-MCNC: 2.1 MG/DL — SIGNIFICANT CHANGE UP (ref 1.6–2.6)
MCHC RBC-ENTMCNC: 33.8 PG — SIGNIFICANT CHANGE UP (ref 27–34)
MCHC RBC-ENTMCNC: 34.4 % — SIGNIFICANT CHANGE UP (ref 32–36)
MCV RBC AUTO: 98.3 FL — SIGNIFICANT CHANGE UP (ref 80–100)
MONOCYTES # BLD AUTO: 0.65 K/UL — SIGNIFICANT CHANGE UP (ref 0–0.9)
MONOCYTES NFR BLD AUTO: 10 % — SIGNIFICANT CHANGE UP (ref 2–14)
NEUTROPHILS # BLD AUTO: 3.61 K/UL — SIGNIFICANT CHANGE UP (ref 1.8–7.4)
NEUTROPHILS NFR BLD AUTO: 55.3 % — SIGNIFICANT CHANGE UP (ref 43–77)
NRBC # FLD: 0 — SIGNIFICANT CHANGE UP
PHOSPHATE SERPL-MCNC: 3.1 MG/DL — SIGNIFICANT CHANGE UP (ref 2.5–4.5)
PLATELET # BLD AUTO: 229 K/UL — SIGNIFICANT CHANGE UP (ref 150–400)
PMV BLD: 9.7 FL — SIGNIFICANT CHANGE UP (ref 7–13)
POTASSIUM SERPL-MCNC: 4.1 MMOL/L — SIGNIFICANT CHANGE UP (ref 3.5–5.3)
POTASSIUM SERPL-SCNC: 4.1 MMOL/L — SIGNIFICANT CHANGE UP (ref 3.5–5.3)
RBC # BLD: 3.58 M/UL — LOW (ref 3.8–5.2)
RBC # FLD: 12.1 % — SIGNIFICANT CHANGE UP (ref 10.3–14.5)
SODIUM SERPL-SCNC: 142 MMOL/L — SIGNIFICANT CHANGE UP (ref 135–145)
SPECIMEN SOURCE: SIGNIFICANT CHANGE UP
WBC # BLD: 6.52 K/UL — SIGNIFICANT CHANGE UP (ref 3.8–10.5)
WBC # FLD AUTO: 6.52 K/UL — SIGNIFICANT CHANGE UP (ref 3.8–10.5)

## 2017-09-22 PROCEDURE — 99239 HOSP IP/OBS DSCHRG MGMT >30: CPT

## 2017-09-22 RX ORDER — FLUTICASONE PROPIONATE 220 MCG
2 AEROSOL WITH ADAPTER (GRAM) INHALATION
Qty: 0 | Refills: 0 | COMMUNITY

## 2017-09-22 RX ORDER — FLUTICASONE PROPIONATE 220 MCG
0 AEROSOL WITH ADAPTER (GRAM) INHALATION
Qty: 0 | Refills: 0 | COMMUNITY

## 2017-09-22 RX ADMIN — MAGNESIUM OXIDE 400 MG ORAL TABLET 400 MILLIGRAM(S): 241.3 TABLET ORAL at 05:41

## 2017-09-22 RX ADMIN — Medication 0.8 MILLIGRAM(S): at 12:57

## 2017-09-22 RX ADMIN — PREGABALIN 1000 MICROGRAM(S): 225 CAPSULE ORAL at 12:57

## 2017-09-22 RX ADMIN — Medication 2000 UNIT(S): at 12:57

## 2017-09-22 RX ADMIN — Medication 50 MICROGRAM(S): at 05:37

## 2017-09-22 RX ADMIN — Medication 81 MILLIGRAM(S): at 12:57

## 2017-09-22 RX ADMIN — RALOXIFENE HYDROCHLORIDE 60 MILLIGRAM(S): 60 TABLET, COATED ORAL at 12:57

## 2017-09-22 RX ADMIN — HEPARIN SODIUM 5000 UNIT(S): 5000 INJECTION INTRAVENOUS; SUBCUTANEOUS at 05:37

## 2017-09-22 NOTE — PROGRESS NOTE ADULT - PROBLEM SELECTOR PROBLEM 7
Prophylactic use of unfractionated heparin for venous thromboembolism

## 2017-09-22 NOTE — PROGRESS NOTE ADULT - ATTENDING COMMENTS
Patient seen and examined, d/w HS, agree with above.     Metabolic encephalopathy setting of UTI, c/w abx, f/u Ucx speciation and sensitivities, tailor abx appropriately. Mental status appears to be improving, knew yesterday was [birth year] birthday. Lives with daughter and HHA, apprec CM/SW assistance, will need resumption of HHA services prior to discharge.
Patient seen and examined, d/w HS, agree with above with following additions:     Bcx NG, Repeat Ucx NG. Completed 3 days of Ceftriaxone. Feeling good. Son Ronnell at bedside, HHA reinstated, ready for discharge.     Discharge time 35 minutes
Patient seen and examined, d/w HS, agree with above.     Ucx with 3+organisms suggesting contamination. Patient also clinically improved, likely can complete 3 day course of Ceftriaxone (last dose tomorrow). Son Ronnell also at bedside, feels that patient appears to him much better. D/w CM/SW, will plan to reinstate HHA, possible d/c planning tomorrow.

## 2017-09-22 NOTE — PROGRESS NOTE ADULT - SUBJECTIVE AND OBJECTIVE BOX
Subjective:    Patient is a 88y old  Female who presents with a chief complaint of foul smelling urine, fever (20 Sep 2017 05:29) - pt largely unchanged since admission, no complaints of dysuria/abdominal or flank pain; now afebrile, not tachy.      MEDICATIONS  (STANDING):  aspirin enteric coated 81 milliGRAM(s) Oral daily  raloxifene 60 milliGRAM(s) Oral daily  senna 2 Tablet(s) Oral at bedtime  magnesium oxide 400 milliGRAM(s) Oral every 12 hours  cyanocobalamin 1000 MICROGram(s) Oral daily  folic acid 0.8 milliGRAM(s) Oral daily  heparin  Injectable 5000 Unit(s) SubCutaneous every 8 hours  cefTRIAXone   IVPB 1 Gram(s) IV Intermittent every 24 hours  sodium chloride 0.9%. 1000 milliLiter(s) (75 mL/Hr) IV Continuous <Continuous>  cholecalciferol 2000 Unit(s) Oral daily  levothyroxine 50 MICROGram(s) Oral <User Schedule>  levothyroxine 75 MICROGram(s) Oral <User Schedule>  influenza   Vaccine 0.5 milliLiter(s) IntraMuscular once  sodium chloride 0.9% Bolus 500 milliLiter(s) IV Bolus once    MEDICATIONS  (PRN):  acetaminophen   Tablet. 650 milliGRAM(s) Oral every 6 hours PRN mild to moderate pain (1-6)  sodium chloride 0.65% Nasal 1 Spray(s) Both Nostrils two times a day PRN Nasal Congestion    Objective:    Vitals: Vital Signs Last 24 Hrs  T(C): 36.8 (17 @ 06:51), Max: 37.9 (17 @ 23:18)  T(F): 98.3 (17 @ 06:51), Max: 100.2 (17 @ 23:18)  HR: 78 (17 @ 06:51) (78 - 99)  BP: 100/55 (17 @ 06:51) (100/55 - 121/74)  BP(mean): --  RR: 16 (17 @ 06:51) (13 - 16)  SpO2: 97% (17 @ 06:51) (94% - 98%)            I&O's Summary    PHYSICAL EXAM:  GENERAL: NAD, well-groomed, well-developed  HEAD:  Atraumatic, Normocephalic  CHEST/LUNG: Clear to percussion bilaterally; No rales, rhonchi, wheezing, or rubs  HEART: Regular rate and rhythm; No murmurs, rubs, or gallops  ABDOMEN: Soft, Nontender, Nondistended; Bowel sounds present  EXTREMITIES:  2+ Peripheral Pulses, No clubbing, cyanosis, or edema  SKIN: sacral erythema, concerning for pressure ulcer   NERVOUS SYSTEM:  Alert & Oriented X3, Good concentration; Motor Strength 5/5 B/L upper and lower extremities; DTRs 2+ intact and symmetric                                             LABS:      138  |  104  |  29<H>  ----------------------------<  162<H>  3.9   |  19<L>  |  2.18<H>    Ca    9.3      20 Sep 2017 00:18    TPro  6.4  /  Alb  3.5  /  TBili  1.0  /  DBili  x   /  AST  11  /  ALT  13  /  AlkPhos  46      Urinalysis Basic - ( 20 Sep 2017 02:13 )    Color: YELLOW / Appearance: TURBID / S.017 / pH: 7.5  Gluc: NEGATIVE / Ketone: TRACE  / Bili: NEGATIVE / Urobili: NORMAL E.U.   Blood: TRACE / Protein: 500 / Nitrite: NEGATIVE   Leuk Esterase: SMALL / RBC: 5-10 / WBC >50   Sq Epi: OCC / Non Sq Epi: x / Bacteria: MANY               12.3   11.22 )-----------( 221      ( 20 Sep 2017 00:18 )             35.7
Subjective:    Patient is a 88y old  Female who presents with a chief complaint of foul smelling urine, fever (20 Sep 2017 13:31) - no overnight events, pt remains afebrile w/o leukocytosis; pending repeat UCx given previous results were likely due to contamination     MEDICATIONS  (STANDING):  aspirin enteric coated 81 milliGRAM(s) Oral daily  raloxifene 60 milliGRAM(s) Oral daily  senna 2 Tablet(s) Oral at bedtime  magnesium oxide 400 milliGRAM(s) Oral every 12 hours  cyanocobalamin 1000 MICROGram(s) Oral daily  folic acid 0.8 milliGRAM(s) Oral daily  heparin  Injectable 5000 Unit(s) SubCutaneous every 8 hours  cefTRIAXone   IVPB 1 Gram(s) IV Intermittent every 24 hours  cholecalciferol 2000 Unit(s) Oral daily  levothyroxine 50 MICROGram(s) Oral <User Schedule>  levothyroxine 75 MICROGram(s) Oral <User Schedule>  influenza   Vaccine 0.5 milliLiter(s) IntraMuscular once    MEDICATIONS  (PRN):  acetaminophen   Tablet. 650 milliGRAM(s) Oral every 6 hours PRN mild to moderate pain (1-6)  sodium chloride 0.65% Nasal 1 Spray(s) Both Nostrils two times a day PRN Nasal Congestion    Objective:    Vitals: Vital Signs Last 24 Hrs  T(C): 37 (09-22-17 @ 05:41), Max: 37.2 (09-21-17 @ 13:21)  T(F): 98.6 (09-22-17 @ 05:41), Max: 99 (09-21-17 @ 13:21)  HR: 72 (09-22-17 @ 05:41) (72 - 81)  BP: 121/58 (09-22-17 @ 05:41) (112/59 - 121/58)  BP(mean): --  RR: 18 (09-22-17 @ 05:41) (17 - 18)  SpO2: 97% (09-22-17 @ 05:41) (96% - 99%)            I&O's Summary    21 Sep 2017 07:01  -  22 Sep 2017 07:00  --------------------------------------------------------  IN: 0 mL / OUT: 500 mL / NET: -500 mL    PHYSICAL EXAM:  GENERAL: NAD, well-groomed, well-developed  HEAD:  Atraumatic, Normocephalic  CHEST/LUNG: Clear to percussion bilaterally; No rales, rhonchi, wheezing, or rubs  HEART: Regular rate and rhythm; No murmurs, rubs, or gallops  ABDOMEN: Soft, Nontender, Nondistended; Bowel sounds present                                            LABS:  09-21    141  |  109<H>  |  19  ----------------------------<  111<H>  4.0   |  19<L>  |  1.81<H>  09-20    138  |  104  |  29<H>  ----------------------------<  162<H>  3.9   |  19<L>  |  2.18<H>    Ca    8.9      21 Sep 2017 05:30  Ca    9.3      20 Sep 2017 00:18  Phos  2.2     09-21  Mg     2.2     09-21    TPro  6.1  /  Alb  3.3  /  TBili  0.4  /  DBili  x   /  AST  11  /  ALT  12  /  AlkPhos  40  09-21  TPro  6.4  /  Alb  3.5  /  TBili  1.0  /  DBili  x   /  AST  11  /  ALT  13  /  AlkPhos  46  09-20                        12.1   6.52  )-----------( 229      ( 22 Sep 2017 04:52 )             35.2                         11.7   6.66  )-----------( 209      ( 21 Sep 2017 05:30 )             33.6                         12.3   11.22 )-----------( 221      ( 20 Sep 2017 00:18 )             35.7
Subjective:    Patient is a 88y old  Female who presents with a chief complaint of foul smelling urine, fever (20 Sep 2017 13:31) - no overnight events    MEDICATIONS  (STANDING):  aspirin enteric coated 81 milliGRAM(s) Oral daily  raloxifene 60 milliGRAM(s) Oral daily  senna 2 Tablet(s) Oral at bedtime  magnesium oxide 400 milliGRAM(s) Oral every 12 hours  cyanocobalamin 1000 MICROGram(s) Oral daily  folic acid 0.8 milliGRAM(s) Oral daily  heparin  Injectable 5000 Unit(s) SubCutaneous every 8 hours  cefTRIAXone   IVPB 1 Gram(s) IV Intermittent every 24 hours  cholecalciferol 2000 Unit(s) Oral daily  levothyroxine 50 MICROGram(s) Oral <User Schedule>  levothyroxine 75 MICROGram(s) Oral <User Schedule>  influenza   Vaccine 0.5 milliLiter(s) IntraMuscular once  potassium phosphate IVPB 15 milliMole(s) IV Intermittent once    MEDICATIONS  (PRN):  acetaminophen   Tablet. 650 milliGRAM(s) Oral every 6 hours PRN mild to moderate pain (1-6)  sodium chloride 0.65% Nasal 1 Spray(s) Both Nostrils two times a day PRN Nasal Congestion    Objective:    Vitals: Vital Signs Last 24 Hrs  T(C): 37.1 (17 @ 05:51), Max: 37.3 (17 @ 14:19)  T(F): 98.7 (17 @ 05:51), Max: 99.1 (17 @ 14:19)  HR: 75 (17 @ 05:51) (75 - 86)  BP: 124/65 (17 @ 05:51) (122/61 - 130/55)  BP(mean): --  RR: 18 (17 @ 05:51) (17 - 18)  SpO2: 98% (17 @ 05:51) (98% - 98%)            I&O's Summary    20 Sep 2017 07:01  -  21 Sep 2017 07:00  --------------------------------------------------------  IN: 800 mL / OUT: 0 mL / NET: 800 mL    PHYSICAL EXAM:  GENERAL: NAD, well-groomed, well-developed  HEAD:  Atraumatic, Normocephalic  CHEST/LUNG: Clear to ausc bilaterally; No rales, rhonchi, wheezing, or rubs  HEART: Regular rate and rhythm; No murmurs, rubs, or gallops  ABDOMEN: Soft, Nontender, Nondistended; Bowel sounds present                                          LABS:      141  |  109<H>  |  19  ----------------------------<  111<H>  4.0   |  19<L>  |  1.81<H>      138  |  104  |  29<H>  ----------------------------<  162<H>  3.9   |  19<L>  |  2.18<H>    Ca    8.9      21 Sep 2017 05:30  Ca    9.3      20 Sep 2017 00:18  Phos  2.2       Mg     2.2         TPro  6.1  /  Alb  3.3  /  TBili  0.4  /  DBili  x   /  AST  11  /  ALT  12  /  AlkPhos  40    TPro  6.4  /  Alb  3.5  /  TBili  1.0  /  DBili  x   /  AST  11  /  ALT  13  /  AlkPhos  46                      Urinalysis Basic - ( 20 Sep 2017 02:13 )    Color: YELLOW / Appearance: TURBID / S.017 / pH: 7.5  Gluc: NEGATIVE / Ketone: TRACE  / Bili: NEGATIVE / Urobili: NORMAL E.U.   Blood: TRACE / Protein: 500 / Nitrite: NEGATIVE   Leuk Esterase: SMALL / RBC: 5-10 / WBC >50   Sq Epi: OCC / Non Sq Epi: x / Bacteria: MANY               11.7   6.66  )-----------( 209      ( 21 Sep 2017 05:30 )             33.6                         12.3   11.22 )-----------( 221      ( 20 Sep 2017 00:18 )             35.7

## 2017-09-22 NOTE — PROGRESS NOTE ADULT - PROBLEM SELECTOR PROBLEM 3
Osteoporosis, unspecified osteoporosis type, unspecified pathological fracture presence

## 2017-09-22 NOTE — PROGRESS NOTE ADULT - PROBLEM SELECTOR PLAN 7
-HSQ for ppx  -frequent repositioning in bed as per protocol   - wound care examined sacral erythema - likely related to wearing a diaper and being incontinent  - no active pressure ulcer   - fall and aspiration precautions
-HSQ for ppx  -frequent repositioning in bed as per protocol   - report of possible sacral decub - will monitor for pressure ulcer and get wound care consult eval if necessary  - fall and aspiration precautions
-HSQ for ppx  -frequent repositioning in bed as per protocol   - wound care examined sacral erythema - likely related to wearing a diaper and being incontinent  - no active pressure ulcer   - fall and aspiration precautions

## 2017-09-22 NOTE — PROGRESS NOTE ADULT - PROBLEM SELECTOR PLAN 1
On empiric Ceftriaxone; afebrile w/o leukocytosis; mental status improved  - f/u urine  repeat Ucx  - c/w ceftriaxone - will adjust ABx once speciation and sensitivities are available  -c/w maintenance IVF - NS at 75 mL/Hr x 24 hours   -antipyretic PRN On empiric Ceftriaxone; afebrile w/o leukocytosis; mental status improved  - f/u repeat Ucx  - c/w ceftriaxone - will adjust ABx once speciation and sensitivities are available  -c/w maintenance IVF - NS at 75 mL/Hr x 24 hours   -antipyretic PRN

## 2017-09-22 NOTE — PROGRESS NOTE ADULT - PROBLEM SELECTOR PLAN 3
-c/w daily raloxifene  - concern for DVT/PE in the setting of pt on SERM who is generally immobile - will monitor for S&S of thromboembolism
-c/w daily raloxifene
-c/w daily raloxifene  - concern for DVT/PE in the setting of pt on SERM who is generally immobile - will monitor for S&S of thromboembolism

## 2017-09-22 NOTE — PROGRESS NOTE ADULT - PROBLEM SELECTOR PLAN 5
Controlled; not actively complaining of pain at the moment  -tylenol prn for pain
Controlled; not actively complaining of pain at the moment  -tylenol prn for pain  -consider around the clock low dose tylenol
Controlled; not actively complaining of pain at the moment  -tylenol prn for pain

## 2017-09-22 NOTE — PROGRESS NOTE ADULT - PROBLEM SELECTOR PLAN 6
creatinine = 1.81   -avoid nephrotoxic agents + renally dose all meds  -on IVF hydration; evaluate for any signs of fluid overload - currently euvolemic   -on Mg Q12 in setting of CKD creatinine = 1.67  -avoid nephrotoxic agents + renally dose all meds  -on IVF hydration; evaluate for any signs of fluid overload - currently euvolemic   -on Mg Q12 in setting of CKD

## 2017-09-22 NOTE — PROGRESS NOTE ADULT - PROBLEM SELECTOR PLAN 4
Ordinarily hypertensive, but now having soft BP measurements presently  -controlled off meds - will hold meds for time being as pt initially met sepsis criteria  -IVF hydration
holding BP meds in setting of permissible pressure range  -IVF hydration
holding BP meds in setting of permissible pressure range  -IVF hydration

## 2017-09-22 NOTE — PROGRESS NOTE ADULT - PROBLEM SELECTOR PLAN 2
Controlled with synthroid home regimen; TSH WNL, not the culprit of encephalopathic picture  -c/w home dose of synthroid  -follow bowel movement and treat for constipation, as necessary
-c/w home dose of synthroid  -follow bowel movement and treat for constipation, as necessary
-c/w home dose of synthroid  -follow bowel movement and treat for constipation, as necessary

## 2017-09-25 LAB
BACTERIA BLD CULT: SIGNIFICANT CHANGE UP
BACTERIA BLD CULT: SIGNIFICANT CHANGE UP

## 2019-05-08 NOTE — ED ADULT NURSE NOTE - OBJECTIVE STATEMENT
P No Break coverage RN received pt to room 3 A&Ox1 on assessment daughter at bedside reports AMS and foul smelling urine x 3 days @ home, PCP prescribed bactrim yesterday without relief, temperature up to 101. Rectal temp obtained, skin noted to be intact, urine collected using sterile technique, IV placed, labs sent, VS as documented, will endorse to primary RN.

## 2023-10-03 NOTE — ED ADULT NURSE NOTE - NS ED NURSE RECORD ANOTHER HT AND WT
Quality 130: Documentation Of Current Medications In The Medical Record: Current Medications Documented Detail Level: Detailed Quality 431: Preventive Care And Screening: Unhealthy Alcohol Use - Screening: Patient not identified as an unhealthy alcohol user when screened for unhealthy alcohol use using a systematic screening method Quality 110: Preventive Care And Screening: Influenza Immunization: Influenza immunization was not ordered or administered, reason not given Quality 226: Preventive Care And Screening: Tobacco Use: Screening And Cessation Intervention: Patient screened for tobacco use and is an ex/non-smoker No